# Patient Record
Sex: FEMALE | Race: WHITE | NOT HISPANIC OR LATINO | URBAN - METROPOLITAN AREA
[De-identification: names, ages, dates, MRNs, and addresses within clinical notes are randomized per-mention and may not be internally consistent; named-entity substitution may affect disease eponyms.]

---

## 2018-06-13 ENCOUNTER — EMERGENCY (EMERGENCY)
Facility: HOSPITAL | Age: 83
LOS: 1 days | Discharge: HOME | End: 2018-06-13
Attending: EMERGENCY MEDICINE
Payer: MEDICARE

## 2018-06-13 VITALS
HEART RATE: 94 BPM | OXYGEN SATURATION: 98 % | RESPIRATION RATE: 18 BRPM | DIASTOLIC BLOOD PRESSURE: 74 MMHG | SYSTOLIC BLOOD PRESSURE: 165 MMHG | TEMPERATURE: 99 F

## 2018-06-13 VITALS
DIASTOLIC BLOOD PRESSURE: 77 MMHG | TEMPERATURE: 98 F | SYSTOLIC BLOOD PRESSURE: 158 MMHG | OXYGEN SATURATION: 99 % | HEART RATE: 99 BPM | RESPIRATION RATE: 18 BRPM

## 2018-06-13 LAB
ALBUMIN SERPL ELPH-MCNC: 4.5 G/DL — SIGNIFICANT CHANGE UP (ref 3.5–5)
ALP SERPL-CCNC: 79 U/L — SIGNIFICANT CHANGE UP (ref 40–120)
ALT FLD-CCNC: 19 U/L DA — SIGNIFICANT CHANGE UP (ref 10–60)
ANION GAP SERPL CALC-SCNC: 6 MMOL/L — SIGNIFICANT CHANGE UP (ref 5–17)
APPEARANCE UR: CLEAR — SIGNIFICANT CHANGE UP
AST SERPL-CCNC: 12 U/L — SIGNIFICANT CHANGE UP (ref 10–40)
BASOPHILS # BLD AUTO: 0 K/UL — SIGNIFICANT CHANGE UP (ref 0–0.2)
BASOPHILS NFR BLD AUTO: 0.7 % — SIGNIFICANT CHANGE UP (ref 0–2)
BILIRUB SERPL-MCNC: 0.3 MG/DL — SIGNIFICANT CHANGE UP (ref 0.2–1.2)
BILIRUB UR-MCNC: NEGATIVE — SIGNIFICANT CHANGE UP
BUN SERPL-MCNC: 9 MG/DL — SIGNIFICANT CHANGE UP (ref 7–18)
CALCIUM SERPL-MCNC: 9.5 MG/DL — SIGNIFICANT CHANGE UP (ref 8.4–10.5)
CHLORIDE SERPL-SCNC: 100 MMOL/L — SIGNIFICANT CHANGE UP (ref 96–108)
CO2 SERPL-SCNC: 29 MMOL/L — SIGNIFICANT CHANGE UP (ref 22–31)
COLOR SPEC: YELLOW — SIGNIFICANT CHANGE UP
CREAT SERPL-MCNC: 0.78 MG/DL — SIGNIFICANT CHANGE UP (ref 0.5–1.3)
DIFF PNL FLD: NEGATIVE — SIGNIFICANT CHANGE UP
EOSINOPHIL # BLD AUTO: 0 K/UL — SIGNIFICANT CHANGE UP (ref 0–0.5)
EOSINOPHIL NFR BLD AUTO: 0.3 % — SIGNIFICANT CHANGE UP (ref 0–6)
GLUCOSE SERPL-MCNC: 99 MG/DL — SIGNIFICANT CHANGE UP (ref 70–99)
GLUCOSE UR QL: NEGATIVE — SIGNIFICANT CHANGE UP
HCT VFR BLD CALC: 43.2 % — SIGNIFICANT CHANGE UP (ref 34.5–45)
HGB BLD-MCNC: 14.1 G/DL — SIGNIFICANT CHANGE UP (ref 11.5–15.5)
KETONES UR-MCNC: ABNORMAL
LACTATE SERPL-SCNC: 1.8 MMOL/L — SIGNIFICANT CHANGE UP (ref 0.7–2)
LEUKOCYTE ESTERASE UR-ACNC: ABNORMAL
LIDOCAIN IGE QN: 213 U/L — SIGNIFICANT CHANGE UP (ref 73–393)
LYMPHOCYTES # BLD AUTO: 1 K/UL — SIGNIFICANT CHANGE UP (ref 1–3.3)
LYMPHOCYTES # BLD AUTO: 17 % — SIGNIFICANT CHANGE UP (ref 13–44)
MCHC RBC-ENTMCNC: 30.7 PG — SIGNIFICANT CHANGE UP (ref 27–34)
MCHC RBC-ENTMCNC: 32.5 GM/DL — SIGNIFICANT CHANGE UP (ref 32–36)
MCV RBC AUTO: 94.4 FL — SIGNIFICANT CHANGE UP (ref 80–100)
MONOCYTES # BLD AUTO: 0.4 K/UL — SIGNIFICANT CHANGE UP (ref 0–0.9)
MONOCYTES NFR BLD AUTO: 6.9 % — SIGNIFICANT CHANGE UP (ref 2–14)
NEUTROPHILS # BLD AUTO: 4.2 K/UL — SIGNIFICANT CHANGE UP (ref 1.8–7.4)
NEUTROPHILS NFR BLD AUTO: 75 % — SIGNIFICANT CHANGE UP (ref 43–77)
NITRITE UR-MCNC: NEGATIVE — SIGNIFICANT CHANGE UP
PH UR: 8 — SIGNIFICANT CHANGE UP (ref 5–8)
PLATELET # BLD AUTO: 238 K/UL — SIGNIFICANT CHANGE UP (ref 150–400)
POTASSIUM SERPL-MCNC: 4.8 MMOL/L — SIGNIFICANT CHANGE UP (ref 3.5–5.3)
POTASSIUM SERPL-SCNC: 4.8 MMOL/L — SIGNIFICANT CHANGE UP (ref 3.5–5.3)
PROT SERPL-MCNC: 7.6 G/DL — SIGNIFICANT CHANGE UP (ref 6–8.3)
PROT UR-MCNC: NEGATIVE — SIGNIFICANT CHANGE UP
RBC # BLD: 4.58 M/UL — SIGNIFICANT CHANGE UP (ref 3.8–5.2)
RBC # FLD: 11.7 % — SIGNIFICANT CHANGE UP (ref 10.3–14.5)
SODIUM SERPL-SCNC: 135 MMOL/L — SIGNIFICANT CHANGE UP (ref 135–145)
SP GR SPEC: 1.01 — SIGNIFICANT CHANGE UP (ref 1.01–1.02)
TROPONIN I SERPL-MCNC: <0.015 NG/ML — SIGNIFICANT CHANGE UP (ref 0–0.04)
UROBILINOGEN FLD QL: NEGATIVE — SIGNIFICANT CHANGE UP
WBC # BLD: 5.6 K/UL — SIGNIFICANT CHANGE UP (ref 3.8–10.5)
WBC # FLD AUTO: 5.6 K/UL — SIGNIFICANT CHANGE UP (ref 3.8–10.5)

## 2018-06-13 PROCEDURE — 99285 EMERGENCY DEPT VISIT HI MDM: CPT

## 2018-06-13 PROCEDURE — 96374 THER/PROPH/DIAG INJ IV PUSH: CPT | Mod: XU

## 2018-06-13 PROCEDURE — 74177 CT ABD & PELVIS W/CONTRAST: CPT

## 2018-06-13 PROCEDURE — 80053 COMPREHEN METABOLIC PANEL: CPT

## 2018-06-13 PROCEDURE — 87086 URINE CULTURE/COLONY COUNT: CPT

## 2018-06-13 PROCEDURE — 85027 COMPLETE CBC AUTOMATED: CPT

## 2018-06-13 PROCEDURE — 83605 ASSAY OF LACTIC ACID: CPT

## 2018-06-13 PROCEDURE — 83690 ASSAY OF LIPASE: CPT

## 2018-06-13 PROCEDURE — 76705 ECHO EXAM OF ABDOMEN: CPT

## 2018-06-13 PROCEDURE — 99284 EMERGENCY DEPT VISIT MOD MDM: CPT | Mod: 25

## 2018-06-13 PROCEDURE — 76705 ECHO EXAM OF ABDOMEN: CPT | Mod: 26

## 2018-06-13 PROCEDURE — 81001 URINALYSIS AUTO W/SCOPE: CPT

## 2018-06-13 PROCEDURE — 74177 CT ABD & PELVIS W/CONTRAST: CPT | Mod: 26

## 2018-06-13 PROCEDURE — 84484 ASSAY OF TROPONIN QUANT: CPT

## 2018-06-13 RX ORDER — FAMOTIDINE 10 MG/ML
20 INJECTION INTRAVENOUS ONCE
Qty: 0 | Refills: 0 | Status: COMPLETED | OUTPATIENT
Start: 2018-06-13 | End: 2018-06-13

## 2018-06-13 RX ORDER — SODIUM CHLORIDE 9 MG/ML
1000 INJECTION INTRAMUSCULAR; INTRAVENOUS; SUBCUTANEOUS ONCE
Qty: 0 | Refills: 0 | Status: COMPLETED | OUTPATIENT
Start: 2018-06-13 | End: 2018-06-13

## 2018-06-13 RX ORDER — SODIUM CHLORIDE 9 MG/ML
3 INJECTION INTRAMUSCULAR; INTRAVENOUS; SUBCUTANEOUS ONCE
Qty: 0 | Refills: 0 | Status: COMPLETED | OUTPATIENT
Start: 2018-06-13 | End: 2018-06-13

## 2018-06-13 RX ORDER — NITROFURANTOIN MACROCRYSTAL 50 MG
1 CAPSULE ORAL
Qty: 14 | Refills: 0
Start: 2018-06-13 | End: 2018-06-19

## 2018-06-13 RX ORDER — MORPHINE SULFATE 50 MG/1
2 CAPSULE, EXTENDED RELEASE ORAL ONCE
Qty: 0 | Refills: 0 | Status: DISCONTINUED | OUTPATIENT
Start: 2018-06-13 | End: 2018-06-13

## 2018-06-13 RX ADMIN — SODIUM CHLORIDE 3 MILLILITER(S): 9 INJECTION INTRAMUSCULAR; INTRAVENOUS; SUBCUTANEOUS at 13:34

## 2018-06-13 RX ADMIN — FAMOTIDINE 20 MILLIGRAM(S): 10 INJECTION INTRAVENOUS at 16:48

## 2018-06-13 RX ADMIN — SODIUM CHLORIDE 1000 MILLILITER(S): 9 INJECTION INTRAMUSCULAR; INTRAVENOUS; SUBCUTANEOUS at 16:48

## 2018-06-13 NOTE — ED PROVIDER NOTE - OBJECTIVE STATEMENT
82 y/o female with PMHx of HTN, HLD presents to the ED c/o RUQ abd pain x 4 days. Pt notes she is currently on Pantoprazole and ASS, but has not been able to take her meds today. Pt denies vomiting, fever, or any other complaints. NKDA.

## 2018-06-13 NOTE — ED PROVIDER NOTE - PROGRESS NOTE DETAILS
Lugo:  Pt received in signout to f/u UA and anticipated D/C.  UA with possible UTI.  D/w Pt with Thai interpretor, Glen # [id number].  Will prescribe Macrobid.  Advised GI and PMD f/u and return precautions.  Pt feels improved overall.

## 2018-06-13 NOTE — ED ADULT NURSE NOTE - OBJECTIVE STATEMENT
pt is here for abdominal pain for 3 days. c/o pain 7/10, denied N/V/D, denied fever or sob, pt calm at this time.

## 2018-06-13 NOTE — ED PROVIDER NOTE - CARE PLAN
Principal Discharge DX:	Syncope, unspecified syncope type  Secondary Diagnosis:	Alcohol withdrawal Principal Discharge DX:	Right upper quadrant abdominal pain

## 2018-06-14 LAB
CULTURE RESULTS: NO GROWTH — SIGNIFICANT CHANGE UP
SPECIMEN SOURCE: SIGNIFICANT CHANGE UP

## 2019-04-06 ENCOUNTER — EMERGENCY (EMERGENCY)
Facility: HOSPITAL | Age: 84
LOS: 1 days | Discharge: HOME | End: 2019-04-06
Attending: EMERGENCY MEDICINE
Payer: MEDICARE

## 2019-04-06 VITALS
HEIGHT: 65 IN | DIASTOLIC BLOOD PRESSURE: 76 MMHG | TEMPERATURE: 98 F | HEART RATE: 88 BPM | RESPIRATION RATE: 16 BRPM | WEIGHT: 138.01 LBS | SYSTOLIC BLOOD PRESSURE: 130 MMHG | OXYGEN SATURATION: 97 %

## 2019-04-06 PROCEDURE — 93005 ELECTROCARDIOGRAM TRACING: CPT

## 2019-04-06 PROCEDURE — 99282 EMERGENCY DEPT VISIT SF MDM: CPT

## 2019-04-06 PROCEDURE — 93010 ELECTROCARDIOGRAM REPORT: CPT

## 2019-04-06 PROCEDURE — 99283 EMERGENCY DEPT VISIT LOW MDM: CPT | Mod: 25

## 2019-04-06 NOTE — ED PROVIDER NOTE - NSFOLLOWUPCLINICS_GEN_ALL_ED_FT
[facility]  [facility]  [address] [facility] [place] - [address] [place] [address]  Phone: [phone]  Fax: [phone]  Follow Up Time:

## 2019-04-06 NOTE — ED PROVIDER NOTE - CLINICAL SUMMARY MEDICAL DECISION MAKING FREE TEXT BOX
83 y/o F presents with likely MSK pain and elevated blood pressure secondary to pain. Low suspicion for hypertensive emergency or ACS. Discharge with PMD follow up.

## 2019-04-06 NOTE — ED PROVIDER NOTE - OBJECTIVE STATEMENT
85 y/o F with PMHx of HTN, HLD and no significant PSHx presents to the ED with complaints of elevated blood pressure and L elbow pain. Patient states pain began today; states pain possibly explained by the fact, patient was reading and rested elbow on hard surface. Denies chest pain, shortness of breath, palpitations, dizziness, headache, neurological symptoms or any other acute complaints.

## 2019-04-06 NOTE — ED PROVIDER NOTE - NSFOLLOWUPINSTRUCTIONS_ED_ALL_ED_FT
Take meds as prescribed by your doctor.  Take Tylenol/Ibuprofen for pains/fevers as needed.  Drink plenty of fluids.  Follow up with your doctor or in the Clinic as discussed within 2 days.  Return to the ER for any concerns.

## 2019-08-15 ENCOUNTER — INPATIENT (INPATIENT)
Facility: HOSPITAL | Age: 84
LOS: 0 days | Discharge: HOME | DRG: 310 | End: 2019-08-16
Attending: INTERNAL MEDICINE | Admitting: INTERNAL MEDICINE
Payer: MEDICARE

## 2019-08-15 VITALS
TEMPERATURE: 98 F | RESPIRATION RATE: 16 BRPM | WEIGHT: 136.03 LBS | HEIGHT: 65 IN | SYSTOLIC BLOOD PRESSURE: 133 MMHG | HEART RATE: 87 BPM | DIASTOLIC BLOOD PRESSURE: 84 MMHG | OXYGEN SATURATION: 97 %

## 2019-08-15 DIAGNOSIS — R07.9 CHEST PAIN, UNSPECIFIED: ICD-10-CM

## 2019-08-15 DIAGNOSIS — I10 ESSENTIAL (PRIMARY) HYPERTENSION: ICD-10-CM

## 2019-08-15 DIAGNOSIS — E78.5 HYPERLIPIDEMIA, UNSPECIFIED: ICD-10-CM

## 2019-08-15 DIAGNOSIS — Z29.9 ENCOUNTER FOR PROPHYLACTIC MEASURES, UNSPECIFIED: ICD-10-CM

## 2019-08-15 LAB
ALBUMIN SERPL ELPH-MCNC: 3.5 G/DL — SIGNIFICANT CHANGE UP (ref 3.5–5)
ALP SERPL-CCNC: 70 U/L — SIGNIFICANT CHANGE UP (ref 40–120)
ALT FLD-CCNC: 17 U/L DA — SIGNIFICANT CHANGE UP (ref 10–60)
ANION GAP SERPL CALC-SCNC: 5 MMOL/L — SIGNIFICANT CHANGE UP (ref 5–17)
APTT BLD: 27.4 SEC — LOW (ref 27.5–36.3)
AST SERPL-CCNC: 14 U/L — SIGNIFICANT CHANGE UP (ref 10–40)
BASOPHILS # BLD AUTO: 0.02 K/UL — SIGNIFICANT CHANGE UP (ref 0–0.2)
BASOPHILS NFR BLD AUTO: 0.5 % — SIGNIFICANT CHANGE UP (ref 0–2)
BILIRUB SERPL-MCNC: 0.3 MG/DL — SIGNIFICANT CHANGE UP (ref 0.2–1.2)
BUN SERPL-MCNC: 11 MG/DL — SIGNIFICANT CHANGE UP (ref 7–18)
CALCIUM SERPL-MCNC: 9 MG/DL — SIGNIFICANT CHANGE UP (ref 8.4–10.5)
CHLORIDE SERPL-SCNC: 102 MMOL/L — SIGNIFICANT CHANGE UP (ref 96–108)
CK MB BLD-MCNC: 2.6 % — SIGNIFICANT CHANGE UP (ref 0–3.5)
CK MB CFR SERPL CALC: 2.9 NG/ML — SIGNIFICANT CHANGE UP (ref 0–3.6)
CK SERPL-CCNC: 110 U/L — SIGNIFICANT CHANGE UP (ref 21–215)
CO2 SERPL-SCNC: 29 MMOL/L — SIGNIFICANT CHANGE UP (ref 22–31)
CREAT SERPL-MCNC: 0.85 MG/DL — SIGNIFICANT CHANGE UP (ref 0.5–1.3)
EOSINOPHIL # BLD AUTO: 0.07 K/UL — SIGNIFICANT CHANGE UP (ref 0–0.5)
EOSINOPHIL NFR BLD AUTO: 1.8 % — SIGNIFICANT CHANGE UP (ref 0–6)
GLUCOSE SERPL-MCNC: 96 MG/DL — SIGNIFICANT CHANGE UP (ref 70–99)
HCT VFR BLD CALC: 37.7 % — SIGNIFICANT CHANGE UP (ref 34.5–45)
HGB BLD-MCNC: 12.8 G/DL — SIGNIFICANT CHANGE UP (ref 11.5–15.5)
IMM GRANULOCYTES NFR BLD AUTO: 0.3 % — SIGNIFICANT CHANGE UP (ref 0–1.5)
INR BLD: 0.89 RATIO — SIGNIFICANT CHANGE UP (ref 0.88–1.16)
LYMPHOCYTES # BLD AUTO: 0.84 K/UL — LOW (ref 1–3.3)
LYMPHOCYTES # BLD AUTO: 21.8 % — SIGNIFICANT CHANGE UP (ref 13–44)
MAGNESIUM SERPL-MCNC: 2.3 MG/DL — SIGNIFICANT CHANGE UP (ref 1.6–2.6)
MCHC RBC-ENTMCNC: 31.8 PG — SIGNIFICANT CHANGE UP (ref 27–34)
MCHC RBC-ENTMCNC: 34 GM/DL — SIGNIFICANT CHANGE UP (ref 32–36)
MCV RBC AUTO: 93.8 FL — SIGNIFICANT CHANGE UP (ref 80–100)
MONOCYTES # BLD AUTO: 0.43 K/UL — SIGNIFICANT CHANGE UP (ref 0–0.9)
MONOCYTES NFR BLD AUTO: 11.1 % — SIGNIFICANT CHANGE UP (ref 2–14)
NEUTROPHILS # BLD AUTO: 2.49 K/UL — SIGNIFICANT CHANGE UP (ref 1.8–7.4)
NEUTROPHILS NFR BLD AUTO: 64.5 % — SIGNIFICANT CHANGE UP (ref 43–77)
NRBC # BLD: 0 /100 WBCS — SIGNIFICANT CHANGE UP (ref 0–0)
PHOSPHATE SERPL-MCNC: 2.7 MG/DL — SIGNIFICANT CHANGE UP (ref 2.5–4.5)
PLATELET # BLD AUTO: 217 K/UL — SIGNIFICANT CHANGE UP (ref 150–400)
POTASSIUM SERPL-MCNC: 4.2 MMOL/L — SIGNIFICANT CHANGE UP (ref 3.5–5.3)
POTASSIUM SERPL-SCNC: 4.2 MMOL/L — SIGNIFICANT CHANGE UP (ref 3.5–5.3)
PROT SERPL-MCNC: 6.4 G/DL — SIGNIFICANT CHANGE UP (ref 6–8.3)
PROTHROM AB SERPL-ACNC: 9.8 SEC — LOW (ref 10–12.9)
RBC # BLD: 4.02 M/UL — SIGNIFICANT CHANGE UP (ref 3.8–5.2)
RBC # FLD: 12.6 % — SIGNIFICANT CHANGE UP (ref 10.3–14.5)
SODIUM SERPL-SCNC: 136 MMOL/L — SIGNIFICANT CHANGE UP (ref 135–145)
TROPONIN I SERPL-MCNC: <0.015 NG/ML — SIGNIFICANT CHANGE UP (ref 0–0.04)
TROPONIN I SERPL-MCNC: <0.015 NG/ML — SIGNIFICANT CHANGE UP (ref 0–0.04)
WBC # BLD: 3.86 K/UL — SIGNIFICANT CHANGE UP (ref 3.8–10.5)
WBC # FLD AUTO: 3.86 K/UL — SIGNIFICANT CHANGE UP (ref 3.8–10.5)

## 2019-08-15 PROCEDURE — 99285 EMERGENCY DEPT VISIT HI MDM: CPT

## 2019-08-15 PROCEDURE — 71045 X-RAY EXAM CHEST 1 VIEW: CPT | Mod: 26

## 2019-08-15 RX ORDER — METOPROLOL TARTRATE 50 MG
50 TABLET ORAL DAILY
Refills: 0 | Status: DISCONTINUED | OUTPATIENT
Start: 2019-08-15 | End: 2019-08-15

## 2019-08-15 RX ORDER — ATORVASTATIN CALCIUM 80 MG/1
80 TABLET, FILM COATED ORAL AT BEDTIME
Refills: 0 | Status: DISCONTINUED | OUTPATIENT
Start: 2019-08-15 | End: 2019-08-16

## 2019-08-15 RX ORDER — ATORVASTATIN CALCIUM 80 MG/1
20 TABLET, FILM COATED ORAL AT BEDTIME
Refills: 0 | Status: DISCONTINUED | OUTPATIENT
Start: 2019-08-15 | End: 2019-08-15

## 2019-08-15 RX ORDER — NITROGLYCERIN 6.5 MG
0 CAPSULE, EXTENDED RELEASE ORAL
Qty: 0 | Refills: 0 | DISCHARGE

## 2019-08-15 RX ORDER — OMEPRAZOLE 10 MG/1
0 CAPSULE, DELAYED RELEASE ORAL
Qty: 0 | Refills: 0 | DISCHARGE

## 2019-08-15 RX ORDER — ASPIRIN/CALCIUM CARB/MAGNESIUM 324 MG
81 TABLET ORAL DAILY
Refills: 0 | Status: DISCONTINUED | OUTPATIENT
Start: 2019-08-15 | End: 2019-08-16

## 2019-08-15 RX ORDER — ENOXAPARIN SODIUM 100 MG/ML
40 INJECTION SUBCUTANEOUS DAILY
Refills: 0 | Status: DISCONTINUED | OUTPATIENT
Start: 2019-08-15 | End: 2019-08-16

## 2019-08-15 RX ORDER — POLYETHYLENE GLYCOL 3350 17 G/17G
17 POWDER, FOR SOLUTION ORAL DAILY
Refills: 0 | Status: DISCONTINUED | OUTPATIENT
Start: 2019-08-15 | End: 2019-08-16

## 2019-08-15 RX ADMIN — ATORVASTATIN CALCIUM 80 MILLIGRAM(S): 80 TABLET, FILM COATED ORAL at 21:18

## 2019-08-15 RX ADMIN — Medication 81 MILLIGRAM(S): at 12:06

## 2019-08-15 RX ADMIN — Medication 10 MILLIGRAM(S): at 21:18

## 2019-08-15 RX ADMIN — POLYETHYLENE GLYCOL 3350 17 GRAM(S): 17 POWDER, FOR SOLUTION ORAL at 16:35

## 2019-08-15 RX ADMIN — Medication 50 MILLIGRAM(S): at 12:09

## 2019-08-15 RX ADMIN — ENOXAPARIN SODIUM 40 MILLIGRAM(S): 100 INJECTION SUBCUTANEOUS at 12:06

## 2019-08-15 NOTE — H&P ADULT - HISTORY OF PRESENT ILLNESS
85 yo F from home, lives alone, ambulates independently with pmh of HTN, HLD, Disc herniation, chronic cystitis presents from home after cardiology office called her about a possible arrythmia on her Holter monitor. Pt was placed on Holter monitor for 24 hrs initially and then extended to 3 weeks by her cardiologist She received a call at 3 am today that it is medical emergency and she has to go to the hospital immediately due to arrhythmia. She experienced contraction and relaxation of the heart. It is a/w chest pain, pressure type, squeezing, sub sternal, radiating laterally and to the back, aggravated with anxiety, negative thoughts and relieved with distractions. Her son passed away couple of years ago due to stroke that makes her condition worse. She was hypotensive 90/45, her PMD decreased the dose of Enalapril to 2.5 from 10mg. She had prior episodes of LOC twice in a year but since 2 yrs not experiencing. When she has LOC, she sits on the floor, EMS will be called and she will be taken to the Clinic where they administer some different solutions and monitor her for 2-3 hrs.. Denies fever, cough, SOB, palpitations, syncope, dizziness, edema, nausea, vomiting, pleuritic chest pain, palpitations.  GOC Needs time to make a decision.

## 2019-08-15 NOTE — ED PROVIDER NOTE - NS ED ROS FT
CONSTITUTIONAL: no fever, no chills   EYES: no visual changes, no eye pain   ENMT: no nasal congestion, no throat pain  CARDIOVASCULAR: +chest pain, no edema, no palpitations   RESPIRATORY: no shortness of breath, no cough   GASTROINTESTINAL: no abdominal pain, no nausea, no vomiting, no diarrhea, no constipation   GENITOURINARY: no dysuria, no frequency  MUSCULOSKELETAL: no joint pains, no myalgias, no back pain   SKIN: no rashes  NEUROLOGICAL: no weakness, no headache, no dizziness, no slurred speech, no syncope   PSYCHIATRIC: no known mental health illness   HEME/LYMPH: no lymphadenopathy      All other ROS negative except as per HPI

## 2019-08-15 NOTE — ED PROVIDER NOTE - OBJECTIVE STATEMENT
[id number] 83 yo F pmh of HTN and HLD presents from home after cardiology office called her about a possible arrythmia on her Holter monitor. Pt also c/o CP that started yesterday, worse on L side, lasted for a few hours, non pleuritic, not related to exertion. Denies fever, cough, SOB, palpitations, syncope, dizziness, edema, other acute complaints. Dominican  [id number]

## 2019-08-15 NOTE — H&P ADULT - ASSESSMENT
83 yo F from home, lives alone, ambulates independently with pmh of HTN, HLD, Disc herniation, chronic cystitis presents from home after cardiology office called her about a possible arrythmia on her Holter monitor.. She was admitted to [facility]

## 2019-08-15 NOTE — ED PROVIDER NOTE - PROGRESS NOTE DETAILS
EKG - nsr, rate 67, 1st deg AV block, PACs  labs - no acute findings  CXR - rotated, no infiltrates   Spoke with pt's primary cardiologist Dr Kamran Fay who requests consult with Dr Redmond or Dr Hinds. He was told by team monitoring pt's Holter that she was having multiple episodes of sinus pause. EKG - nsr, rate 67, 1st deg AV block, PACs  labs - no acute findings  CXR - rotated, no infiltrates   Spoke with pt's primary cardiologist Dr Kamran Fay; he was told by team monitoring pt's Holter that she was having multiple episodes of sinus pause; requests consult with Dr Redmond who is being covered by Dr Chand who requests admission to Dr Marie. Endorsed to MAR.

## 2019-08-15 NOTE — ED ADULT NURSE NOTE - OBJECTIVE STATEMENT
Pateint is a 84 y.o femal complaingin of chhest  x 1 day, Patient is a 84 y.o femal complaining of chest pain  x 1 day, Patient is a 84 y.o female complaining of chest pain  x 2 days.

## 2019-08-15 NOTE — H&P ADULT - NSHPPHYSICALEXAM_GEN_ALL_CORE
CONSTITUTIONAL: Well appearing, well nourished, awake, alert and in no apparent distress  ENT: Airway patent, Nasal mucosa clear. Mouth with normal mucosa. Throat has no vesicles, no oropharyngeal exudates and uvula is midline.  EYES: Clear bilaterally, pupils equal, round and reactive to light. EOMI.  CARDIAC: Normal rate, regular rhythm.  Heart sounds S1, S2.  No murmurs, rubs or gallops   RESPIRATORY: Breath sounds clear and equal bilaterally. No wheezes, rales or rhonchi  MUSCULOSKELETAL: Spine appears normal, range of motion is not limited, no muscle or joint tenderness  EXTREMITIES: No edema, cyanosis or deformity   NEUROLOGICAL: Alert and oriented, no focal deficits, no motor or sensory deficits.  SKIN: No rash, skin turgor

## 2019-08-15 NOTE — H&P ADULT - PROBLEM SELECTOR PLAN 4
IMPROVE VTE Individual Risk Assessment    RISK                                                                Points  [  ] Previous VTE                                                  3  [  ] Thrombophilia                                               2  [  ] Lower limb paralysis                                      2        (unable to hold up >15 seconds)    [  ] Current Cancer                                              2         (within 6 months)  [x ] Immobilization > 24 hrs                                1  [  ] ICU/CCU stay > 24 hours                              1  [x  ] Age > 60                                                      1  IMPROVE VTE Score _2__DVT ppx Lovenox 40 sub q______  )

## 2019-08-15 NOTE — H&P ADULT - PROBLEM SELECTOR PLAN 1
Pt presented with pressure like sub sternal chest pain concerning for ACS.  On Tele  HEART score 4, moderate risk   EKG showed premature atrial complexes  Troponin x 1 negative, f/u Trop 2,3  f/u ECHO  Cardiologist Dr Redmond  Continue Metoprolol, lipitor, aspirin Pt presented with pressure like sub sternal chest pain concerning for ACS.  On Tele  HEART score 4, moderate risk   EKG showed premature atrial complexes  Troponin x 1and T2 negative, f/u Trop ,3  f/u ECHO  Cardiologist Dr Redmond   Metoprolol is on hold for pauses on holter monitor  c/w  lipitor, aspirin

## 2019-08-15 NOTE — H&P ADULT - PROBLEM SELECTOR PLAN 2
Resumed home medications   -Currently on Metoprolol 50 and enalapril 2.5  -BP WNL upon evaluation  DASH diet   -Continue to monitor Resumed home medications   -Currently  on  enalapril 10 dose is increased from home dose 2.5  -BP WNL upon evaluation  DASH diet   -Continue to monitor

## 2019-08-15 NOTE — H&P ADULT - NSHPLABSRESULTS_GEN_ALL_CORE
LABS:                        12.8   3.86  )-----------( 217      ( 15 Aug 2019 04:58 )             37.7     08-15    136  |  102  |  11  ----------------------------<  96  4.2   |  29  |  0.85    Ca    9.0      15 Aug 2019 04:58    TPro  6.4  /  Alb  3.5  /  TBili  0.3  /  DBili  x   /  AST  14  /  ALT  17  /  AlkPhos  70  08-15    PT/INR - ( 15 Aug 2019 04:58 )   PT: 9.8 sec;   INR: 0.89 ratio         PTT - ( 15 Aug 2019 04:58 )  PTT:27.4 sec    LIVER FUNCTIONS - ( 15 Aug 2019 04:58 )  Alb: 3.5 g/dL / Pro: 6.4 g/dL / ALK PHOS: 70 U/L / ALT: 17 U/L DA / AST: 14 U/L / GGT: x

## 2019-08-15 NOTE — ED PROVIDER NOTE - CLINICAL SUMMARY MEDICAL DECISION MAKING FREE TEXT BOX
85 yo F with CP. Also told about a possible arrythmia on Holter monitor. Will place on monitor in ED, EKG, labs, CXR, reassess.

## 2019-08-16 ENCOUNTER — TRANSCRIPTION ENCOUNTER (OUTPATIENT)
Age: 84
End: 2019-08-16

## 2019-08-16 VITALS
TEMPERATURE: 99 F | DIASTOLIC BLOOD PRESSURE: 70 MMHG | HEART RATE: 80 BPM | OXYGEN SATURATION: 98 % | RESPIRATION RATE: 18 BRPM | SYSTOLIC BLOOD PRESSURE: 133 MMHG

## 2019-08-16 LAB
24R-OH-CALCIDIOL SERPL-MCNC: 37.5 NG/ML — SIGNIFICANT CHANGE UP (ref 30–80)
ALBUMIN SERPL ELPH-MCNC: 3.7 G/DL — SIGNIFICANT CHANGE UP (ref 3.5–5)
ALP SERPL-CCNC: 61 U/L — SIGNIFICANT CHANGE UP (ref 40–120)
ALT FLD-CCNC: 16 U/L DA — SIGNIFICANT CHANGE UP (ref 10–60)
ANION GAP SERPL CALC-SCNC: 7 MMOL/L — SIGNIFICANT CHANGE UP (ref 5–17)
AST SERPL-CCNC: 12 U/L — SIGNIFICANT CHANGE UP (ref 10–40)
BASOPHILS # BLD AUTO: 0.03 K/UL — SIGNIFICANT CHANGE UP (ref 0–0.2)
BASOPHILS NFR BLD AUTO: 0.7 % — SIGNIFICANT CHANGE UP (ref 0–2)
BILIRUB SERPL-MCNC: 0.5 MG/DL — SIGNIFICANT CHANGE UP (ref 0.2–1.2)
BUN SERPL-MCNC: 10 MG/DL — SIGNIFICANT CHANGE UP (ref 7–18)
CALCIUM SERPL-MCNC: 9.2 MG/DL — SIGNIFICANT CHANGE UP (ref 8.4–10.5)
CHLORIDE SERPL-SCNC: 100 MMOL/L — SIGNIFICANT CHANGE UP (ref 96–108)
CHOLEST SERPL-MCNC: 222 MG/DL — HIGH (ref 10–199)
CK MB BLD-MCNC: 2.3 % — SIGNIFICANT CHANGE UP (ref 0–3.5)
CK MB CFR SERPL CALC: 2.3 NG/ML — SIGNIFICANT CHANGE UP (ref 0–3.6)
CK SERPL-CCNC: 99 U/L — SIGNIFICANT CHANGE UP (ref 21–215)
CO2 SERPL-SCNC: 26 MMOL/L — SIGNIFICANT CHANGE UP (ref 22–31)
CREAT SERPL-MCNC: 0.72 MG/DL — SIGNIFICANT CHANGE UP (ref 0.5–1.3)
EOSINOPHIL # BLD AUTO: 0.04 K/UL — SIGNIFICANT CHANGE UP (ref 0–0.5)
EOSINOPHIL NFR BLD AUTO: 0.9 % — SIGNIFICANT CHANGE UP (ref 0–6)
FOLATE SERPL-MCNC: 14.2 NG/ML — SIGNIFICANT CHANGE UP
GLUCOSE SERPL-MCNC: 96 MG/DL — SIGNIFICANT CHANGE UP (ref 70–99)
HBA1C BLD-MCNC: 5.8 % — HIGH (ref 4–5.6)
HCT VFR BLD CALC: 39.1 % — SIGNIFICANT CHANGE UP (ref 34.5–45)
HDLC SERPL-MCNC: 102 MG/DL — SIGNIFICANT CHANGE UP
HGB BLD-MCNC: 13.3 G/DL — SIGNIFICANT CHANGE UP (ref 11.5–15.5)
IMM GRANULOCYTES NFR BLD AUTO: 0.2 % — SIGNIFICANT CHANGE UP (ref 0–1.5)
LIPID PNL WITH DIRECT LDL SERPL: 103 MG/DL — SIGNIFICANT CHANGE UP
LYMPHOCYTES # BLD AUTO: 1.02 K/UL — SIGNIFICANT CHANGE UP (ref 1–3.3)
LYMPHOCYTES # BLD AUTO: 24 % — SIGNIFICANT CHANGE UP (ref 13–44)
MAGNESIUM SERPL-MCNC: 2.3 MG/DL — SIGNIFICANT CHANGE UP (ref 1.6–2.6)
MCHC RBC-ENTMCNC: 31.3 PG — SIGNIFICANT CHANGE UP (ref 27–34)
MCHC RBC-ENTMCNC: 34 GM/DL — SIGNIFICANT CHANGE UP (ref 32–36)
MCV RBC AUTO: 92 FL — SIGNIFICANT CHANGE UP (ref 80–100)
MONOCYTES # BLD AUTO: 0.45 K/UL — SIGNIFICANT CHANGE UP (ref 0–0.9)
MONOCYTES NFR BLD AUTO: 10.6 % — SIGNIFICANT CHANGE UP (ref 2–14)
NEUTROPHILS # BLD AUTO: 2.7 K/UL — SIGNIFICANT CHANGE UP (ref 1.8–7.4)
NEUTROPHILS NFR BLD AUTO: 63.6 % — SIGNIFICANT CHANGE UP (ref 43–77)
NRBC # BLD: 0 /100 WBCS — SIGNIFICANT CHANGE UP (ref 0–0)
PHOSPHATE SERPL-MCNC: 3.1 MG/DL — SIGNIFICANT CHANGE UP (ref 2.5–4.5)
PLATELET # BLD AUTO: 244 K/UL — SIGNIFICANT CHANGE UP (ref 150–400)
POTASSIUM SERPL-MCNC: 3.9 MMOL/L — SIGNIFICANT CHANGE UP (ref 3.5–5.3)
POTASSIUM SERPL-SCNC: 3.9 MMOL/L — SIGNIFICANT CHANGE UP (ref 3.5–5.3)
PROT SERPL-MCNC: 6.6 G/DL — SIGNIFICANT CHANGE UP (ref 6–8.3)
RBC # BLD: 4.25 M/UL — SIGNIFICANT CHANGE UP (ref 3.8–5.2)
RBC # FLD: 12.8 % — SIGNIFICANT CHANGE UP (ref 10.3–14.5)
SODIUM SERPL-SCNC: 133 MMOL/L — LOW (ref 135–145)
TOTAL CHOLESTEROL/HDL RATIO MEASUREMENT: 2.2 RATIO — LOW (ref 3.3–7.1)
TRIGL SERPL-MCNC: 84 MG/DL — SIGNIFICANT CHANGE UP (ref 10–149)
TROPONIN I SERPL-MCNC: <0.015 NG/ML — SIGNIFICANT CHANGE UP (ref 0–0.04)
TSH SERPL-MCNC: 1.19 UU/ML — SIGNIFICANT CHANGE UP (ref 0.34–4.82)
VIT B12 SERPL-MCNC: 428 PG/ML — SIGNIFICANT CHANGE UP (ref 232–1245)
WBC # BLD: 4.25 K/UL — SIGNIFICANT CHANGE UP (ref 3.8–10.5)
WBC # FLD AUTO: 4.25 K/UL — SIGNIFICANT CHANGE UP (ref 3.8–10.5)

## 2019-08-16 PROCEDURE — 82553 CREATINE MB FRACTION: CPT

## 2019-08-16 PROCEDURE — 85610 PROTHROMBIN TIME: CPT

## 2019-08-16 PROCEDURE — 93306 TTE W/DOPPLER COMPLETE: CPT

## 2019-08-16 PROCEDURE — 80061 LIPID PANEL: CPT

## 2019-08-16 PROCEDURE — 84443 ASSAY THYROID STIM HORMONE: CPT

## 2019-08-16 PROCEDURE — 86900 BLOOD TYPING SEROLOGIC ABO: CPT

## 2019-08-16 PROCEDURE — 84484 ASSAY OF TROPONIN QUANT: CPT

## 2019-08-16 PROCEDURE — 85730 THROMBOPLASTIN TIME PARTIAL: CPT

## 2019-08-16 PROCEDURE — 71045 X-RAY EXAM CHEST 1 VIEW: CPT

## 2019-08-16 PROCEDURE — 83036 HEMOGLOBIN GLYCOSYLATED A1C: CPT

## 2019-08-16 PROCEDURE — 86901 BLOOD TYPING SEROLOGIC RH(D): CPT

## 2019-08-16 PROCEDURE — 36415 COLL VENOUS BLD VENIPUNCTURE: CPT

## 2019-08-16 PROCEDURE — 82306 VITAMIN D 25 HYDROXY: CPT

## 2019-08-16 PROCEDURE — 82607 VITAMIN B-12: CPT

## 2019-08-16 PROCEDURE — 99285 EMERGENCY DEPT VISIT HI MDM: CPT | Mod: 25

## 2019-08-16 PROCEDURE — 82746 ASSAY OF FOLIC ACID SERUM: CPT

## 2019-08-16 PROCEDURE — 83735 ASSAY OF MAGNESIUM: CPT

## 2019-08-16 PROCEDURE — 93005 ELECTROCARDIOGRAM TRACING: CPT

## 2019-08-16 PROCEDURE — 84100 ASSAY OF PHOSPHORUS: CPT

## 2019-08-16 PROCEDURE — 82550 ASSAY OF CK (CPK): CPT

## 2019-08-16 PROCEDURE — 85027 COMPLETE CBC AUTOMATED: CPT

## 2019-08-16 PROCEDURE — 86850 RBC ANTIBODY SCREEN: CPT

## 2019-08-16 PROCEDURE — 80053 COMPREHEN METABOLIC PANEL: CPT

## 2019-08-16 RX ADMIN — Medication 81 MILLIGRAM(S): at 12:29

## 2019-08-16 RX ADMIN — ENOXAPARIN SODIUM 40 MILLIGRAM(S): 100 INJECTION SUBCUTANEOUS at 12:30

## 2019-08-16 NOTE — DISCHARGE NOTE PROVIDER - NSDCCPCAREPLAN_GEN_ALL_CORE_FT
PRINCIPAL DISCHARGE DIAGNOSIS  Diagnosis: Chest pain  Assessment and Plan of Treatment: You camae to us with chest pain. We got your EKG, Cardiac enzymes which showed no ischemia. We ordered ECHO of your heart but you wanted to leave without following that up.   Please see your PCP and Cardiologist with in 1-2 weeks of geting discharge.      SECONDARY DISCHARGE DIAGNOSES  Diagnosis: Hypertension  Assessment and Plan of Treatment: Blood Pressure Control , Please continue current medication regimen, and follow up with your PCP. You have a history of Hypertension.  Your Blood Pressure was adequately controlled. You should continue on the current antihypertensive regimen regularly. You blood pressure should be within 140/90. You should follow-up with your PCP within 1 week of your discharge for routine blood pressure monitoring at your next visit Notify your doctor if you have any of the following symptoms:   (Dizziness, Lightheadedness, Blurry vision, Headache, Chest pain, Shortness of breath.) You should maintain healthy lifestyle by eating healthy low salt diet, avoid fatty food, weight loss, exercise regularly as tolerated 30 mins X 3 time per week. PRINCIPAL DISCHARGE DIAGNOSIS  Diagnosis: Chest pain  Assessment and Plan of Treatment: You camae to us with chest pain. We got your EKG, Cardiac enzymes which showed no ischemia. We ordered ECHO of your heart which shows EF >55% AND you need paceMAKER BASED ON STRIPS from your holter monitor. but you wanted to leave against medical advise and knows all the associated risks even death. We used [place] interpretors and spent 3 hours trying to convince you. We are giving you all the labs and test results as requested by you.  Please see your PCP and Cardiologist with in 1-2 weeks of geting discharge.      SECONDARY DISCHARGE DIAGNOSES  Diagnosis: Hypertension  Assessment and Plan of Treatment: Blood Pressure Control , Please continue current medication regimen, and follow up with your PCP. You have a history of Hypertension.  Your Blood Pressure was adequately controlled. You should continue on the current antihypertensive regimen regularly. You blood pressure should be within 140/90. You should follow-up with your PCP within 1 week of your discharge for routine blood pressure monitoring at your next visit Notify your doctor if you have any of the following symptoms:   (Dizziness, Lightheadedness, Blurry vision, Headache, Chest pain, Shortness of breath.) You should maintain healthy lifestyle by eating healthy low salt diet, avoid fatty food, weight loss, exercise regularly as tolerated 30 mins X 3 time per week.

## 2019-08-16 NOTE — DISCHARGE NOTE PROVIDER - CARE PROVIDER_API CALL
Amado Redmond)  Cardiovascular Disease  [address] [facility]  [place] [address]  Phone: [phone]  Fax: [phone]  Follow Up Time:

## 2019-08-16 NOTE — CONSULT NOTE ADULT - SUBJECTIVE AND OBJECTIVE BOX
HISTORY OF PRESENT ILLNESS: HPI:  83 yo F from home, lives alone, ambulates independently with pmh of HTN, HLD, Disc herniation, chronic cystitis presents from home after ?cardiology office called her about a possible arrythmia on her Holter monitor. Pt was placed on Holter monitor for 24 hrs initially and then extended to 3 weeks by her cardiologist She received a call at 3 am today that it is medical emergency and she has to go to the hospital immediately due to arrhythmia. Her son passed away couple of years ago due to stroke that makes her condition worse. She denies Anginal symptoms or LOC. She was hypotensive 90/45, her PMD decreased the dose of Enalapril to 2.5 from 10mg. She had prior episodes of LOC twice in a year but since 2 yrs not experiencing. When she has LOC, she sits on the floor, EMS will be called and she will be taken to the Clinic where they administer some different solutions and monitor her for 2-3 hrs.. Denies fever, cough, SOB, palpitations, syncope, dizziness, edema, nausea, vomiting, pleuritic chest pain, palpitations.  GOC Needs time to make a decision. (15 Aug 2019 09:46)      PAST MEDICAL & SURGICAL HISTORY:  HLD (hyperlipidemia)  Hypertension  No significant past surgical history          MEDICATIONS:  MEDICATIONS  (STANDING):  aspirin enteric coated 81 milliGRAM(s) Oral daily  atorvastatin 80 milliGRAM(s) Oral at bedtime  enalapril 10 milliGRAM(s) Oral daily  enoxaparin Injectable 40 milliGRAM(s) SubCutaneous daily  polyethylene glycol 3350 17 Gram(s) Oral daily      Allergies    No Known Allergies    Intolerances        FAMILY HISTORY:  Family history of cancer in father: liver  Family history of cancer in mother: Mother, she had a wart on head, bleeding, removed and biopsied. Diagnosed as malignant melanoma at the age of 23 years. Had 4 surgeries and passed away at the age of 79.  Family history of stroke or transient ischemic attack in son    Non-contributary for premature coronary disease or sudden cardiac death    SOCIAL HISTORY:    [ X] Non-smoker  [ ] Smoker  [ ] Alcohol      REVIEW OF SYSTEMS:  [ ]chest pain  [  ]shortness of breath  [  ]palpitations  [  ]syncope  [ ]near syncope [ ]upper extremity weakness   [ ] lower extremity weakness  [  ]diplopia  [  ]altered mental status   [  ]fevers  [ ]chills [ ]nausea  [ ]vomitting  [  ]dysphagia    [ ]abdominal pain  [ ]melena  [ ]BRBPR    [  ]epistaxis  [  ]rash    [ ]lower extremity edema        [X ] All others negative	  [ ] Unable to obtain      LABS:	 	    CARDIAC MARKERS:  CARDIAC MARKERS ( 16 Aug 2019 06:17 )  <0.015 ng/mL / x     / 99 U/L / x     / 2.3 ng/mL  CARDIAC MARKERS ( 15 Aug 2019 10:27 )  <0.015 ng/mL / x     / 110 U/L / x     / 2.9 ng/mL  CARDIAC MARKERS ( 15 Aug 2019 04:58 )  <0.015 ng/mL / x     / x     / x     / x                                  13.3   4.25  )-----------( 244      ( 16 Aug 2019 06:17 )             39.1     Hb Trend: 13.3<--    08-16    133<L>  |  100  |  10  ----------------------------<  96  3.9   |  26  |  0.72    Ca    9.2      16 Aug 2019 06:17  Phos  3.1     08-16  Mg     2.3     08-16    TPro  6.6  /  Alb  3.7  /  TBili  0.5  /  DBili  x   /  AST  12  /  ALT  16  /  AlkPhos  61  08-16    Creatinine Trend: 0.72<--, 0.85<--    HgA1c: Hemoglobin A1C, Whole Blood: 5.8 % (08-16 @ 09:10)    TSH: Thyroid Stimulating Hormone, Serum: 1.19 uU/mL (08-16 @ 06:17)          PHYSICAL EXAM:  T(C): 37 (08-16-19 @ 07:45), Max: 37 (08-16-19 @ 07:45)  HR: 82 (08-16-19 @ 07:45) (73 - 82)  BP: 132/90 (08-16-19 @ 07:45) (113/60 - 161/76)  RR: 18 (08-16-19 @ 07:45) (16 - 18)  SpO2: 98% (08-16-19 @ 07:45) (96% - 100%)  Wt(kg): --  I&O's Summary    15 Aug 2019 07:01  -  16 Aug 2019 07:00  --------------------------------------------------------  IN: 230 mL / OUT: 0 mL / NET: 230 mL    16 Aug 2019 07:01  -  16 Aug 2019 10:24  --------------------------------------------------------  IN: 200 mL / OUT: 0 mL / NET: 200 mL        Gen: Appears well in NAD  HEENT:  (-)icterus (-)pallor  CV: N S1 S2 1/6 JEFF (+)2 Pulses B/l  Resp:  Clear to ausculatation B/L, normal effort  GI: (+) BS Soft, NT, ND  Lymph:  (-)Edema, (-)obvious lymphadenopathy  Skin: Warm to touch, Normal turgor  Psych: Appropriate mood and affect        TELEMETRY: 	  Sinus, APC's some of which are blocked      ECG:  	    RADIOLOGY:         CXR:     ASSESSMENT/PLAN: 	84y Female HISTORY OF PRESENT ILLNESS: HPI:  85 yo F from home, lives alone, ambulates independently with pmh of HTN, HLD, Disc herniation, chronic cystitis presents from home after ?cardiology office called her about a possible arrythmia on her Holter monitor. Pt was placed on Holter monitor for 24 hrs initially and then extended to 3 weeks by her cardiologist She received a call at 3 am today that it is medical emergency and she has to go to the hospital immediately due to arrhythmia. Her son passed away couple of years ago due to stroke that makes her condition worse. She denies Anginal symptoms or LOC. She was hypotensive 90/45, her PMD decreased the dose of Enalapril to 2.5 from 10mg. She had prior episodes of LOC twice in a year but since 2 yrs not experiencing. When she has LOC, she sits on the floor, EMS will be called and she will be taken to the Clinic where they administer some different solutions and monitor her for 2-3 hrs.. Denies fever, cough, SOB, palpitations, syncope, dizziness, edema, nausea, vomiting, pleuritic chest pain, palpitations.  GOC Needs time to make a decision. (15 Aug 2019 09:46)      PAST MEDICAL & SURGICAL HISTORY:  HLD (hyperlipidemia)  Hypertension  No significant past surgical history          MEDICATIONS:  MEDICATIONS  (STANDING):  aspirin enteric coated 81 milliGRAM(s) Oral daily  atorvastatin 80 milliGRAM(s) Oral at bedtime  enalapril 10 milliGRAM(s) Oral daily  enoxaparin Injectable 40 milliGRAM(s) SubCutaneous daily  polyethylene glycol 3350 17 Gram(s) Oral daily      Allergies    No Known Allergies    Intolerances        FAMILY HISTORY:  Family history of cancer in father: liver  Family history of cancer in mother: Mother, she had a wart on head, bleeding, removed and biopsied. Diagnosed as malignant melanoma at the age of 23 years. Had 4 surgeries and passed away at the age of 79.  Family history of stroke or transient ischemic attack in son    Non-contributary for premature coronary disease or sudden cardiac death    SOCIAL HISTORY:    [ X] Non-smoker  [ ] Smoker  [ ] Alcohol      REVIEW OF SYSTEMS:  [ ]chest pain  [  ]shortness of breath  [  ]palpitations  [  ]syncope  [ ]near syncope [ ]upper extremity weakness   [ ] lower extremity weakness  [  ]diplopia  [  ]altered mental status   [  ]fevers  [ ]chills [ ]nausea  [ ]vomitting  [  ]dysphagia    [ ]abdominal pain  [ ]melena  [ ]BRBPR    [  ]epistaxis  [  ]rash    [ ]lower extremity edema        [X ] All others negative	  [ ] Unable to obtain      LABS:	 	    CARDIAC MARKERS:  CARDIAC MARKERS ( 16 Aug 2019 06:17 )  <0.015 ng/mL / x     / 99 U/L / x     / 2.3 ng/mL  CARDIAC MARKERS ( 15 Aug 2019 10:27 )  <0.015 ng/mL / x     / 110 U/L / x     / 2.9 ng/mL  CARDIAC MARKERS ( 15 Aug 2019 04:58 )  <0.015 ng/mL / x     / x     / x     / x                                  13.3   4.25  )-----------( 244      ( 16 Aug 2019 06:17 )             39.1     Hb Trend: 13.3<--    08-16    133<L>  |  100  |  10  ----------------------------<  96  3.9   |  26  |  0.72    Ca    9.2      16 Aug 2019 06:17  Phos  3.1     08-16  Mg     2.3     08-16    TPro  6.6  /  Alb  3.7  /  TBili  0.5  /  DBili  x   /  AST  12  /  ALT  16  /  AlkPhos  61  08-16    Creatinine Trend: 0.72<--, 0.85<--    HgA1c: Hemoglobin A1C, Whole Blood: 5.8 % (08-16 @ 09:10)    TSH: Thyroid Stimulating Hormone, Serum: 1.19 uU/mL (08-16 @ 06:17)          PHYSICAL EXAM:  T(C): 37 (08-16-19 @ 07:45), Max: 37 (08-16-19 @ 07:45)  HR: 82 (08-16-19 @ 07:45) (73 - 82)  BP: 132/90 (08-16-19 @ 07:45) (113/60 - 161/76)  RR: 18 (08-16-19 @ 07:45) (16 - 18)  SpO2: 98% (08-16-19 @ 07:45) (96% - 100%)  Wt(kg): --  I&O's Summary    15 Aug 2019 07:01  -  16 Aug 2019 07:00  --------------------------------------------------------  IN: 230 mL / OUT: 0 mL / NET: 230 mL    16 Aug 2019 07:01  -  16 Aug 2019 10:24  --------------------------------------------------------  IN: 200 mL / OUT: 0 mL / NET: 200 mL        Gen: Appears well in NAD  HEENT:  (-)icterus (-)pallor  CV: N S1 S2 1/6 JEFF (+)2 Pulses B/l  Resp:  Clear to ausculatation B/L, normal effort  GI: (+) BS Soft, NT, ND  Lymph:  (-)Edema, (-)obvious lymphadenopathy  Skin: Warm to touch, Normal turgor  Psych: Appropriate mood and affect        TELEMETRY: 	  Sinus, APC's some of which are blocked      ECG:  	Sinus 1 degree AV block, APCs, some of which block    RADIOLOGY:         CXR:  No infiltrate    ASSESSMENT/PLAN: 	84y Female   pmh of HTN, HLD, Disc herniation, chronic cystitis presents from home after ?cardiology office called her about a possible arrythmia on her Holter monitor.     - Obtain strips from Dr. Fay's office (call placed)  - Echo  - She has episodes of A-V blocked that may be triggered by APCs.  Level of Block is unclear may need EP eval / EPS  - Discussed With Patient and team    I once again thank you for allowing me to participate in the care of your patient.  If you have any questions or concerns please do not hesitate to contact me.    Amado Redmond MD, [facility]  BEEPER [phone]

## 2019-08-16 NOTE — DISCHARGE NOTE PROVIDER - HOSPITAL COURSE
85 yo F from home, lives alone, ambulates independently with pmh of HTN, HLD, Disc herniation, chronic cystitis presents from home after cardiology office called her about a possible arrythmia on her Holter monitor. Pt was placed on Holter monitor for 24 hrs initially and then extended to 3 weeks by her cardiologist She received a call at 3 am today that it is medical emergency and she has to go to the hospital immediately due to arrhythmia. She experienced contraction and relaxation of the heart. It is a/w chest pain, pressure type, squeezing, sub sternal, radiating laterally and to the back, aggravated with anxiety, negative thoughts and relieved with distractions. Her son passed away couple of years ago due to stroke that makes her condition worse. She was hypotensive 90/45, her PMD decreased the dose of Enalapril to 2.5 from 10mg. She had prior episodes of LOC twice in a year but since 2 yrs not experiencing. When she has LOC, she sits on the floor, EMS will be called and she will be taken to the Clinic where they administer some different solutions and monitor her for 2-3 hrs.. Denies fever, cough, SOB, palpitations, syncope, dizziness, edema, nausea, vomiting, pleuritic chest pain, palpitations.    GOC Needs time to make a decision.         her all three troponins came back negative.     Patietnt want to sign AMA and leave ASAP.

## 2019-09-16 NOTE — H&P ADULT - PROBLEM SELECTOR PLAN 3
Patient with more cough:  Will defer to you if we should be increaseing prednisone since MTX not being started again.    Thank you.        continue with statin  f/u lipid panel

## 2020-02-26 NOTE — ED ADULT NURSE NOTE - NS ED NURSE LEVEL OF CONSCIOUSNESS MENTAL STATUS
Surgical Oncology Inpatient Progress Note    Subjective:  Patient is feeling better. Nausea/pain improved.  +flatus     Objective:  Temp:  [97.6 °F (36.4 °C)-98.4 °F (36.9 °C)] 97.6 °F (36.4 °C)  Pulse:  [107-109] 107  Resp:  [18-20] 18  BP: (112-128)/(72-7 Pager: [phone] Awake/Cooperative/Alert

## 2020-03-24 NOTE — PATIENT PROFILE ADULT - NSPROSPIRITUALVALUESFT_GEN_A_NUR
n/a Isotretinoin Counseling: Patient should get monthly blood tests, not donate blood, not drive at night if vision affected, not share medication, and not undergo elective surgery for 6 months after tx completed. Side effects reviewed, pt to contact office should one occur.

## 2021-01-01 NOTE — ED PROVIDER NOTE - DISPOSITION TYPE
Called this AM by Dr. Felipe Burton resident who was concerned about infant having a low platelet level this AM on her CBC, level was 57. This CBC was drawn from a heelstick. Mom does have a history of idiopathic thrombocytopenia and she was on prednisone during her pregnancy. Repeat level was drawn via venous stick, which was WNL. (220). Examined infant who initially was feeding well at the breast. Infant is vigorous with no signs of petechiae and/or bleeding. Infant has good pulses and heart rate and is well appearing. No further orders were given. DISCHARGE

## 2021-07-03 ENCOUNTER — EMERGENCY (EMERGENCY)
Facility: HOSPITAL | Age: 86
LOS: 1 days | Discharge: HOME | End: 2021-07-03
Attending: EMERGENCY MEDICINE
Payer: MEDICARE

## 2021-07-03 VITALS
WEIGHT: 112.44 LBS | HEART RATE: 83 BPM | RESPIRATION RATE: 20 BRPM | OXYGEN SATURATION: 99 % | TEMPERATURE: 98 F | DIASTOLIC BLOOD PRESSURE: 84 MMHG | SYSTOLIC BLOOD PRESSURE: 164 MMHG

## 2021-07-03 VITALS — SYSTOLIC BLOOD PRESSURE: 150 MMHG | DIASTOLIC BLOOD PRESSURE: 74 MMHG

## 2021-07-03 PROCEDURE — 99283 EMERGENCY DEPT VISIT LOW MDM: CPT | Mod: GC

## 2021-07-03 PROCEDURE — 99283 EMERGENCY DEPT VISIT LOW MDM: CPT

## 2021-07-03 NOTE — ED PROVIDER NOTE - OBJECTIVE STATEMENT
Physical Therapy Daily Treatment Note    Date:  10/19/2018    Patient Name:  Charley Mireles    :  1950  MRN: [id number]  Restrictions/Precautions:    Medical/Treatment Diagnosis Information:  · Diagnosis: R knee pain   Insurance/Certification information:  PT Insurance Information: Medicare and [facility] Sandip Tang  Physician Information:  Referring Practitioner: Tera Ryder MD  Plan of care signed (Y/N):  N  Visit# / total visits:        G-Code (if applicable):         PT G-Codes  Functional Assessment Tool Used: LEFS  Score: 52/80 (44/80 at eval)  Functional Limitation: Mobility: Walking and moving around  Mobility: Walking and Moving Around Current Status (): At least 40 percent but less than 60 percent impaired, limited or restricted  Mobility: Walking and Moving Around Goal Status (): At least 20 percent but less than 40 percent impaired, limited or restricted    Medicare Cap (if applicable):  679 = total amount used, updated 10/19/2018    Time in:   3:00     Timed Treatment: 30 Total Treatment Time:  30  ________________________________________________________________________________________    Pain Level:   0-1/10 R lower back  SUBJECTIVE:  Pt reports that everything has been feeling pretty good. Symptoms ebb and flow now with the weather versus with activity.      OBJECTIVE:     Exercise/Equipment Resistance/Repetitions Other comments          TA set with BP cuff     -    HEP   bridge HEP   LTR HEP   Prone hip IR/ER with resistance    clamshell HEP   Hip stacking with LE on SB 2x30 B              rockerboard  1' balance, 1' rocking each drection    Romberg on airex 2x30\" EO/EC    Dowel angie neuro re-ed     Tandem stance on airex X60\" B    Double limb stance on BOSU X60\"  With minisquat 10x    B post sling 6\" step up 10x B    Lateral post sling with cross stabilization  10x B                TG  Level 3 x 5 minutes                                  Other Therapeutic Activities: Patient is an 86 year old female who has a hx of HTN, HLD who presents for hypertension. Patient states she took a nap and woke up around Patient is an 86 year old female who has a hx of HTN, HLD who presents for hypertension. Patient states she took a nap and woke up around 11pm and took her blood pressure which had SBP > 200. Patient reports feeling anxious and has stressors at home, also reports some slight SOB at time of taking blood pressure. Patient states that she has not missed her home HTN meds. Called the ambulance because she was worried about her blood pressure.

## 2021-07-03 NOTE — ED PROVIDER NOTE - PATIENT PORTAL LINK FT
You can access the FollowMyHealth Patient Portal offered by [facility] by registering at the following website: [email or web address]. By joining [employer]’s FollowMyHealth portal, you will also be able to view your health information using other applications (apps) compatible with our system. You can access the FollowMyHealth Patient Portal offered by [facility] by registering at the following website: [email or web address]. By joining [employer]’s FollowMyHealth portal, you will also be able to view your health information using other applications (apps) compatible with our system.

## 2021-07-03 NOTE — ED PROVIDER NOTE - NSFOLLOWUPINSTRUCTIONS_ED_ALL_ED_FT
You came to the hospital for high blood pressure. This could be due to stress or anxiety at home. Your blood pressure while you were in the Emergency Department was in an acceptable range. You did not have symptoms of organ damage on physical exam. You should continue to take your home blood pressure medication. Follow up with your Primary Care Doctor. Return to the Emergency Department if you develop chest pain or shortness of breath associated with high blood pressure readings.

## 2021-07-03 NOTE — ED ADULT TRIAGE NOTE - BP NONINVASIVE DIASTOLIC (MM HG)
84 Pt is a 47 y/o F nonsmoker PMhx ovarian cancer (last chemo 8/2017), h/o surgical removal of mass (3/9/18) p/w abdominal pain since yesterday -- r/o intra-abdominal pathology -- labs, ua, ucx, CT abd and pelvis w/ contrast

## 2021-07-03 NOTE — ED PROVIDER NOTE - CLINICAL SUMMARY MEDICAL DECISION MAKING FREE TEXT BOX
86 year old female with hx of htn coming in for asymptomatic hypertension. Patient had a SBP reading of 200mmhg at home. In hospital patients SBP readings ranging from 140-170 SBP and 70-80 DBP. Patient is asymptomatic. She denies chest pain, sob, fevers, chills. Patient is stable for d/c home w/ follow up with PCP. 86 year old female with hx of htn coming in for asymptomatic hypertension. Patient had a SBP reading of 200mmhg at home. In hospital patients SBP readings ranging from 140-170 SBP and 70-80 DBP. Patient is asymptomatic. She denies chest pain, sob, fevers, chills. Patient is stable for d/c home w/ follow up with PCP.    Dr. Mercado (Attending Physician)

## 2021-07-03 NOTE — ED PROVIDER NOTE - CARE PROVIDER_API CALL
Vianey Gann  FAMILY MEDICINE  [address]  [place] [id number]  Phone: [phone]  Fax: [phone]  Established Patient  Follow Up Time: 1-3 Days

## 2021-07-03 NOTE — ED ADULT NURSE NOTE - NSIMPLEMENTINTERV_GEN_ALL_ED
Implemented All Fall with Harm Risk Interventions:  [place] to call system. Call bell, personal items and telephone within reach. Instruct patient to call for assistance. Room bathroom lighting operational. Non-slip footwear when patient is off stretcher. Physically safe environment: no spills, clutter or unnecessary equipment. Stretcher in lowest position, wheels locked, appropriate side rails in place. Provide visual cue, wrist band, yellow gown, etc. Monitor gait and stability. Monitor for mental status changes and reorient to person, place, and time. Review medications for side effects contributing to fall risk. Reinforce activity limits and safety measures with patient and family. Provide visual clues: red socks.

## 2021-07-03 NOTE — ED PROVIDER NOTE - ATTENDING CONTRIBUTION TO CARE
Dr. Mercado (Attending Physician)  I performed a history and physical exam of the patient and discussed their management with the resident. I reviewed the resident's note and agree with the documented findings and plan of care. My medical decision making and observations are found above.

## 2021-07-03 NOTE — ED PROVIDER NOTE - PHYSICAL EXAMINATION
PHYSICAL EXAM:  Vital Signs Last 24 Hrs  T(C): 36.7 (07-03-21 @ 01:41)  T(F): 98 (07-03-21 @ 01:41), Max: 98 (07-03-21 @ 01:41)  HR: 72 (07-03-21 @ 01:41) (72 - 83)  BP: 150/74 (07-03-21 @ 02:24)  BP(mean): --  RR: 18 (07-03-21 @ 01:41) (18 - 20)  SpO2: 98% (07-03-21 @ 01:41) (98% - 99%)  Wt(kg): --    Constitutional: NAD, awake and alert  EYES: EOMI  ENT:  Normal Hearing, no tonsillar exudates   Neck: Soft and supple , no thyromegaly   Respiratory: Breath sounds are clear bilaterally, No wheezing, rales or rhonchi  Cardiovascular: RRR, no murmurs   Gastrointestinal: Bowel Sounds present, soft, nontender, nondistended, no guarding, no rebound  Extremities: No cyanosis or clubbing; warm to touch  Vascular: 2+ peripheral pulses lower ex  Neurological: No focal deficits  Musculoskeletal: moving all 4 extremities spontaneously   Skin: No rashes  Psych: no depression or anhedonia, AAOx3  HEME: no bruises, no nose bleeds

## 2021-07-03 NOTE — ED ADULT NURSE NOTE - OBJECTIVE STATEMENT
86y female presents to ED via EMS, complaining of HTN. AOx4 endorses waking up at approximately 2300 on 7/2/21 and feeling "unwell" took BP and SBP was 190, patient took x2 sublingual Nitroglycerin pills. Denies headache, chest pain, fever, chills, SOB, back pain, dysuria, hematuria.

## 2022-06-23 ENCOUNTER — EMERGENCY (EMERGENCY)
Facility: HOSPITAL | Age: 87
LOS: 1 days | Discharge: HOME | End: 2022-06-23
Attending: EMERGENCY MEDICINE
Payer: MEDICARE

## 2022-06-23 VITALS
SYSTOLIC BLOOD PRESSURE: 172 MMHG | HEART RATE: 79 BPM | HEIGHT: 65 IN | TEMPERATURE: 98 F | DIASTOLIC BLOOD PRESSURE: 87 MMHG | RESPIRATION RATE: 18 BRPM | WEIGHT: 112.44 LBS | OXYGEN SATURATION: 97 %

## 2022-06-23 PROCEDURE — 99282 EMERGENCY DEPT VISIT SF MDM: CPT

## 2022-06-23 PROCEDURE — 99283 EMERGENCY DEPT VISIT LOW MDM: CPT

## 2022-06-23 RX ORDER — LABETALOL HCL 100 MG
100 TABLET ORAL ONCE
Refills: 0 | Status: COMPLETED | OUTPATIENT
Start: 2022-06-23 | End: 2022-06-23

## 2022-06-23 RX ORDER — ACETAMINOPHEN 500 MG
975 TABLET ORAL ONCE
Refills: 0 | Status: COMPLETED | OUTPATIENT
Start: 2022-06-23 | End: 2022-06-23

## 2022-06-23 RX ADMIN — Medication 100 MILLIGRAM(S): at 19:57

## 2022-06-23 RX ADMIN — Medication 975 MILLIGRAM(S): at 19:57

## 2022-06-23 NOTE — ED PROVIDER NOTE - OBJECTIVE STATEMENT
86 yo F pmh of HTN, GERD, anxiety, presents with elevated BP at home. Pt was nervous and had mild HA. Denies other acute complaints. Cypriot  used.

## 2022-06-23 NOTE — ED PROVIDER NOTE - PATIENT PORTAL LINK FT
You can access the FollowMyHealth Patient Portal offered by [facility] by registering at the following website: [email or web address]. By joining [employer]’s FollowMyHealth portal, you will also be able to view your health information using other applications (apps) compatible with our system.

## 2022-06-23 NOTE — ED PROVIDER NOTE - CLINICAL SUMMARY MEDICAL DECISION MAKING FREE TEXT BOX
Elderly F with elevated BP  Normal neuro exam  BP improved after meds  Dc with out pt fu  Discussed indications for patient return to ED. Patient understood.

## 2022-06-23 NOTE — ED PROVIDER NOTE - NS ED ROS FT
CONSTITUTIONAL: no fever  EYES: no eye pain   ENMT: no throat pain  CARDIOVASCULAR: no chest pain   RESPIRATORY: no shortness of breath   GASTROINTESTINAL: no abdominal pain   GENITOURINARY: no dysuria   SKIN: no rashes  NEUROLOGICAL: +headache

## 2022-06-23 NOTE — ED PROVIDER NOTE - PHYSICAL EXAMINATION
GENERAL: well appearing, no acute distress   HEAD: atraumatic   EYES: EOMI   ENT: moist oral mucosa   CARDIAC: regular rate  RESPIRATORY: no increased work of breathing   ABDO: soft NTND  MUSCULOSKELETAL: no deformity   NEUROLOGICAL: AAOx3, CN's II-XII intact, strength 5/5 bilateral UE and LE, sensation intact to light touch, finger to nose intact, steady gait  SKIN: no visible rash  PSYCHIATRIC: cooperative

## 2022-11-04 ENCOUNTER — INPATIENT (INPATIENT)
Facility: HOSPITAL | Age: 87
LOS: 0 days | Discharge: HOME | End: 2022-11-05
Attending: INTERNAL MEDICINE | Admitting: INTERNAL MEDICINE

## 2022-11-04 VITALS
HEART RATE: 98 BPM | RESPIRATION RATE: 18 BRPM | TEMPERATURE: 99 F | DIASTOLIC BLOOD PRESSURE: 95 MMHG | OXYGEN SATURATION: 100 % | SYSTOLIC BLOOD PRESSURE: 183 MMHG

## 2022-11-04 PROCEDURE — 93010 ELECTROCARDIOGRAM REPORT: CPT

## 2022-11-04 PROCEDURE — 99285 EMERGENCY DEPT VISIT HI MDM: CPT | Mod: CS

## 2022-11-04 NOTE — ED ADULT TRIAGE NOTE - CHIEF COMPLAINT QUOTE
Pt brought in by EMS from home, c/o elevated BP of 210/114 since this afternoon. States meds would not bring it down. Also endorses lower back pain. No complaints of chest pain, headache, blurry vision, nausea, dizziness, vomiting  SOB, fever, or chills. [facility] HTN     : [id number]

## 2022-11-05 VITALS
HEART RATE: 76 BPM | OXYGEN SATURATION: 100 % | RESPIRATION RATE: 16 BRPM | SYSTOLIC BLOOD PRESSURE: 191 MMHG | TEMPERATURE: 97 F | DIASTOLIC BLOOD PRESSURE: 85 MMHG

## 2022-11-05 DIAGNOSIS — R07.9 CHEST PAIN, UNSPECIFIED: ICD-10-CM

## 2022-11-05 LAB
ALBUMIN SERPL ELPH-MCNC: 4.7 G/DL — SIGNIFICANT CHANGE UP (ref 3.3–5)
ALP SERPL-CCNC: 92 U/L — SIGNIFICANT CHANGE UP (ref 40–120)
ALT FLD-CCNC: 15 U/L — SIGNIFICANT CHANGE UP (ref 4–33)
ANION GAP SERPL CALC-SCNC: 13 MMOL/L — SIGNIFICANT CHANGE UP (ref 7–14)
AST SERPL-CCNC: 21 U/L — SIGNIFICANT CHANGE UP (ref 4–32)
BILIRUB SERPL-MCNC: 0.4 MG/DL — SIGNIFICANT CHANGE UP (ref 0.2–1.2)
BUN SERPL-MCNC: 9 MG/DL — SIGNIFICANT CHANGE UP (ref 7–23)
CALCIUM SERPL-MCNC: 9.2 MG/DL — SIGNIFICANT CHANGE UP (ref 8.4–10.5)
CHLORIDE SERPL-SCNC: 90 MMOL/L — LOW (ref 98–107)
CO2 SERPL-SCNC: 23 MMOL/L — SIGNIFICANT CHANGE UP (ref 22–31)
CREAT SERPL-MCNC: 0.49 MG/DL — LOW (ref 0.5–1.3)
EGFR: 91 ML/MIN/1.73M2 — SIGNIFICANT CHANGE UP
GLUCOSE SERPL-MCNC: 121 MG/DL — HIGH (ref 70–99)
HCT VFR BLD CALC: 37.4 % — SIGNIFICANT CHANGE UP (ref 34.5–45)
HGB BLD-MCNC: 13.1 G/DL — SIGNIFICANT CHANGE UP (ref 11.5–15.5)
MAGNESIUM SERPL-MCNC: 2 MG/DL — SIGNIFICANT CHANGE UP (ref 1.6–2.6)
MCHC RBC-ENTMCNC: 30.4 PG — SIGNIFICANT CHANGE UP (ref 27–34)
MCHC RBC-ENTMCNC: 35 GM/DL — SIGNIFICANT CHANGE UP (ref 32–36)
MCV RBC AUTO: 86.8 FL — SIGNIFICANT CHANGE UP (ref 80–100)
NRBC # BLD: 0 /100 WBCS — SIGNIFICANT CHANGE UP (ref 0–0)
NRBC # FLD: 0 K/UL — SIGNIFICANT CHANGE UP (ref 0–0)
PHOSPHATE SERPL-MCNC: 2.6 MG/DL — SIGNIFICANT CHANGE UP (ref 2.5–4.5)
PLATELET # BLD AUTO: 248 K/UL — SIGNIFICANT CHANGE UP (ref 150–400)
POTASSIUM SERPL-MCNC: 3.9 MMOL/L — SIGNIFICANT CHANGE UP (ref 3.5–5.3)
POTASSIUM SERPL-SCNC: 3.9 MMOL/L — SIGNIFICANT CHANGE UP (ref 3.5–5.3)
PROT SERPL-MCNC: 7 G/DL — SIGNIFICANT CHANGE UP (ref 6–8.3)
RBC # BLD: 4.31 M/UL — SIGNIFICANT CHANGE UP (ref 3.8–5.2)
RBC # FLD: 12.4 % — SIGNIFICANT CHANGE UP (ref 10.3–14.5)
SARS-COV-2 RNA SPEC QL NAA+PROBE: SIGNIFICANT CHANGE UP
SODIUM SERPL-SCNC: 126 MMOL/L — LOW (ref 135–145)
TROPONIN T, HIGH SENSITIVITY RESULT: 18 NG/L — SIGNIFICANT CHANGE UP
WBC # BLD: 6.81 K/UL — SIGNIFICANT CHANGE UP (ref 3.8–10.5)
WBC # FLD AUTO: 6.81 K/UL — SIGNIFICANT CHANGE UP (ref 3.8–10.5)

## 2022-11-05 PROCEDURE — 71046 X-RAY EXAM CHEST 2 VIEWS: CPT | Mod: 26

## 2022-11-05 RX ORDER — ASPIRIN/CALCIUM CARB/MAGNESIUM 324 MG
162 TABLET ORAL ONCE
Refills: 0 | Status: COMPLETED | OUTPATIENT
Start: 2022-11-05 | End: 2022-11-05

## 2022-11-05 RX ORDER — AMLODIPINE BESYLATE 2.5 MG/1
5 TABLET ORAL ONCE
Refills: 0 | Status: COMPLETED | OUTPATIENT
Start: 2022-11-05 | End: 2022-11-05

## 2022-11-05 RX ADMIN — Medication 162 MILLIGRAM(S): at 04:46

## 2022-11-05 NOTE — ED PROVIDER NOTE - NS ED ROS FT
CONSTITUTIONAL: No weakness, fevers or chills  EYES/ENT: No visual changes;  No vertigo or throat pain, + headaches  NECK: No pain or stiffness  RESPIRATORY: No cough, wheezing, hemoptysis; + shortness of breath  CARDIOVASCULAR: +chest pain, no palpitations  GASTROINTESTINAL: + abdominal epigastric pain. No nausea, vomiting, or hematemesis; + constipation. No melena or hematochezia.  GENITOURINARY: No dysuria, frequency or hematuria  NEUROLOGICAL: No numbness or weakness  SKIN: No itching, burning, rashes, or lesions   PSYCH: no hx depression, no hx anxiety

## 2022-11-05 NOTE — ED ADULT NURSE NOTE - CHIEF COMPLAINT QUOTE
Pt brought in by EMS from home, c/o elevated BP of 210/114 since this afternoon. States meds would not bring it down. Also endorses lower back pain. No complaints of chest pain, headache, blurry vision, nausea, dizziness, vomiting  SOB, fever, or chills. [facility] HTN     : [id number]

## 2022-11-05 NOTE — ED PROVIDER NOTE - PLAN OF CARE
87 Y/O F with HTN, HLD, ELENA, GERD presenting with elevated BP, L. sided CP. EKG unremarkable. Will send CBC/CMP, trops, CXR L. sided chest pressure as above  will get trops, CBC/CMP

## 2022-11-05 NOTE — ED PROVIDER NOTE - NSFOLLOWUPCLINICS_GEN_ALL_ED_FT
[facility]  Cardiology  [address] [place] [address]  Phone: [phone]  Fax:   Follow Up Time: 1-3 days

## 2022-11-05 NOTE — ED PROVIDER NOTE - PROGRESS NOTE DETAILS
Reviewed labs. Has hypoNa - pt reports that she has had hypoNa for ~3 years.   admit to tele : #[id number] ([place])   Had multiple conversations with the patient to stay in the hospital for stress test and card recommendations however pt insists on leaving. Pt fixation on getting recommendations written down by the medical team despite multiple conversations with the patient that our recommendation was to stay in the hospital as above.   Will discharge pt with return precautions Patient with initial troponin of 18, still complaining of some chest pain, EKG nonischemic.  Plan was to admit patient to telemetry doc for stress and echo and telemetry, patient was initially in agreement with plan and admission order was placed.  Over the course of the next few hours the patient continued to repeatedly ask for a copy of her lab results and recommendations for follow-up.  Both myself and the resident and the nurse spoke with the patient multiple times via  phone to explain that our recommendation is that she stay in the hospital for stress test and echo, we also discussed her lab test with her.  She continued to state that she did not want stay in the hospital and would prefer to follow-up as an outpatient and just wanted a copy of her results and recommendations for follow-up.  Advised patient that should she want to return the hospital for cardiac work up and admission she can return at any time. Ernestine: Patient with initial troponin of 18, still complaining of some chest pain, EKG nonischemic.  Plan was to admit patient to telemetry doc for stress and echo and telemetry, patient was initially in agreement with plan and admission order was placed.  Over the course of the next few hours the patient continued to repeatedly ask for a copy of her lab results and recommendations for follow-up.  Both myself and the resident and the nurse spoke with the patient multiple times via  phone to explain that our recommendation is that she stay in the hospital for stress test and echo, we also discussed her lab test with her.  She continued to state that she did not want stay in the hospital and would prefer to follow-up as an outpatient and just wanted a copy of her results and recommendations for follow-up.  Advised patient that should she want to return the hospital for cardiac work up and admission she can return at any time.

## 2022-11-05 NOTE — ED PROVIDER NOTE - CARE PLAN
Principal Discharge DX:	Left chest pressure  Assessment and plan of treatment:	89 Y/O F with HTN, HLD, ELENA, GERD presenting with elevated BP, L. sided CP. EKG unremarkable. Will send CBC/CMP, trops, CXR   1 Principal Discharge DX:	Chest pain   Principal Discharge DX:	Chest pain  Assessment and plan of treatment:	L. sided chest pressure as above  will get trops, CBC/CMP

## 2022-11-05 NOTE — ED PROVIDER NOTE - PHYSICAL EXAMINATION
GENERAL: NAD, well-appearing, lying in bed comfortably  HEAD:  Atraumatic, Normocephalic  EYES: EOMI, conjunctiva and sclera clear  ENT: Moist mucous membranes  NECK: Supple, No JVD  CHEST/LUNG: Clear to auscultation bilaterally; No rales, rhonchi, wheezing, or rubs. Unlabored respirations  HEART: Regular rate and rhythm; No murmurs, rubs, or gallops  ABDOMEN: BSx4; Soft, nontender, nondistended  EXTREMITIES:  2+ Peripheral Pulses, brisk capillary refill. No clubbing, cyanosis, or edema  NERVOUS SYSTEM:  A&Ox3, no focal deficits   SKIN: No rashes or lesions

## 2022-11-05 NOTE — ED PROVIDER NOTE - ATTENDING CONTRIBUTION TO CARE
Elderly female with history of hypertension, anxiety presents to the emergency department with complaints of elevated blood pressures at home for the last many weeks as well as intermittent left-sided chest pain radiating to left shoulder.  Patient states she does suffer from anxiety and is prescribed lorazepam.  Saw her PMD yesterday, he did not adjust her medications, performed an EKG which was reportedly normal per patient and told her that she is fine.  She presents today because she is continuing to have chest pain is aborted up with blood pressure readings at home.  Blood pressure here mildly elevated, though not critical.  EKG without acute ischemia.  She has a cardiology appointment on November 20.  Plan for labs with troponin, chest x-ray.  If work-up in ED is negative will likely discharge home as patient already has cardiology follow-up and will give strict return precautions to the emergency department. Elderly female with history of hypertension, anxiety presents to the emergency department with complaints of elevated blood pressures at home for the last many weeks as well as intermittent left-sided chest pain radiating to left shoulder.  Patient states she does suffer from anxiety and is prescribed lorazepam.  Saw her PMD yesterday, he did not adjust her medications, performed an EKG which was reportedly normal per patient and told her that she is fine.  She presents today because she is continuing to have chest pain is aborted up with blood pressure readings at home.  Blood pressure here mildly elevated, though not critical.  EKG without acute ischemia.  She has a cardiology appointment on November 20.  Plan for labs with troponin, chest x-ray.  If work-up in ED is negative will likely discharge home as patient already has cardiology follow-up and will give strict return precautions to the emergency department.  *Same  used "Mariangel"

## 2022-11-05 NOTE — ED ADULT NURSE NOTE - OBJECTIVE STATEMENT
Break coverage- Pt received into spot #20a. Pt primarily Algerian speaking, as per language line , c/o having high blood pressure this evening. Pt states she is compliant with her medications. Denies headache/dizziness/n/v. Denies chest pain at this time. Also c/o lower back pain. MD yang being performed. no acute distress. Awaiting further plan of care. Break coverage- Pt received into spot #20a. Pt primarily Australian speaking, as per language line , c/o having high blood pressure this evening and fluctuating over the past month. Pt states she is compliant with her medications. Denies headache/dizziness/n/v. c/o left sided chest pressure radiating to her shoulder. Also c/o lower back pain. MD yang being performed. no acute distress. Awaiting further plan of care.

## 2022-11-05 NOTE — ED PROVIDER NOTE - PATIENT PORTAL LINK FT
You can access the FollowMyHealth Patient Portal offered by [facility] by registering at the following website: [email or web address]. By joining [employer]’s FollowMyHealth portal, you will also be able to view your health information using other applications (apps) compatible with our system.

## 2022-11-05 NOTE — ED PROVIDER NOTE - CLINICAL SUMMARY MEDICAL DECISION MAKING FREE TEXT BOX
87 Y/O F with HTN, HLD, ELENA, GERD presenting with elevated BP, L. sided CP. EKG unremarkable. Will send CBC/CMP, trops, CXR

## 2022-11-05 NOTE — ED ADULT NURSE REASSESSMENT NOTE - NS ED NURSE REASSESS COMMENT FT1
PT is resting in stretcher, easily arousable to verbal stimuli. no apparent distress noted. pt requesting to be discharged she wants to follow up out patient and does not want to be admitted to the hospital. MD Sinha made aware and DAI [id number] interpret used to educate pt on MD recommendations.  will continue to monitor.

## 2022-11-05 NOTE — ED PROVIDER NOTE - OBJECTIVE STATEMENT
89 Y/O F with HTN, HLD, ELENA, GERD presenting with elevated BP. Notes that her BP is low in the AM usually in the 90's and is high in the afternoon's around 160-170 which has been going on for the past couple of months. Of note, BP has been 200s in the afternoon over the past 2 days. Reports L. sided chest pressure that began in March, described as pressure-like sensation, intermittent, travels to the L. shoulder with more frequency and intensity since yest. Had a PCP appt yest with normal EKG and cardiology appt in a couple of weeks. Also reports bilateral temporal headaches which began several years ago but has noticed that her headaches are worse in the afternoons lately - described as squeezing pain, non-radiating, lasts between mins to hours. Also reports abdominal pain for years usually related to her constipation. Denies nausea, dysuria, hematuria, melena/hematochezia, dizziness,

## 2022-11-05 NOTE — ED PROVIDER NOTE - NSFOLLOWUPINSTRUCTIONS_ED_ALL_ED_FT
You came with high blood pressure and chest pain. You were found to have cardiac enzymes that was at an indeterminate level. Given your concerning symptoms you were recommended to undergo a stress test however you did not want to stay in the hospital for further testing. You were also found to have low sodium levels which has been a chronic issue     If you have continuing progressively worsening chest pain or have worsening symptoms or have dizziness, low blood pressure, please come back to the emergency room.     Please follow-up with your PCP within 48-72 hours. Please follow-up with the cardiologist within 3-5 days for further testing as outpatient.

## 2022-12-12 ENCOUNTER — EMERGENCY (EMERGENCY)
Facility: HOSPITAL | Age: 87
LOS: 1 days | Discharge: HOME | End: 2022-12-12
Attending: EMERGENCY MEDICINE
Payer: MEDICARE

## 2022-12-12 VITALS
WEIGHT: 105.82 LBS | HEIGHT: 61.42 IN | DIASTOLIC BLOOD PRESSURE: 95 MMHG | RESPIRATION RATE: 17 BRPM | OXYGEN SATURATION: 97 % | TEMPERATURE: 98 F | SYSTOLIC BLOOD PRESSURE: 206 MMHG | HEART RATE: 87 BPM

## 2022-12-12 VITALS
HEART RATE: 82 BPM | RESPIRATION RATE: 17 BRPM | SYSTOLIC BLOOD PRESSURE: 180 MMHG | TEMPERATURE: 98 F | DIASTOLIC BLOOD PRESSURE: 88 MMHG | OXYGEN SATURATION: 98 %

## 2022-12-12 PROCEDURE — 99282 EMERGENCY DEPT VISIT SF MDM: CPT

## 2022-12-12 PROCEDURE — 93005 ELECTROCARDIOGRAM TRACING: CPT

## 2022-12-12 PROCEDURE — 93010 ELECTROCARDIOGRAM REPORT: CPT

## 2022-12-12 PROCEDURE — 99284 EMERGENCY DEPT VISIT MOD MDM: CPT

## 2022-12-12 NOTE — ED ADULT NURSE NOTE - SUICIDE SCREENING DEPRESSION
Chief Complaint   Patient presents with     Laceration     Left pinky     Patient is here for a cut on his left pinky that happened yesterday. Patient states he was poking holes into a potato with a knife and hit his finger. Patient states he cleaned the area with peroxide twice.    Initial BP (!) 146/82   Pulse 97   Temp 98.5  F (36.9  C) (Tympanic)   Resp 16   Wt 108 kg (238 lb)   SpO2 98%  There is no height or weight on file to calculate BMI.  Medication Reconciliation: complete    Neelima Correa LPN   Negative

## 2022-12-12 NOTE — ED PROVIDER NOTE - NSICDXFAMILYHX_GEN_ALL_CORE_FT
FAMILY HISTORY:  Family history of cancer in father, liver  Family history of cancer in mother, Mother, she had a wart on head, bleeding, removed and biopsied. Diagnosed as malignant melanoma at the age of 23 years. Had 4 surgeries and passed away at the age of 79.  Family history of stroke or transient ischemic attack in son

## 2022-12-12 NOTE — ED PROVIDER NOTE - OBJECTIVE STATEMENT
88-year-old woman, history of hypertension, history of high cholesterol, complains of elevated blood pressure to a systolic BP of about 200 today, and says she has been taking her metoprolol and losartan.  He denies any new symptoms.  Specifically has no headache/chest pain/shortness of breath/vomiting/dizziness/syncope/focal weakness/hematuria.  She says that occasionally she has low blood pressure and when that happens, she skips a dose of her blood pressure medicine occasionally.

## 2022-12-12 NOTE — ED PROVIDER NOTE - CROS ED ROS STATEMENT
----- Message from Ml Yanez RN sent at 12/5/2017  8:40 AM CST -----  Contact: pt      ----- Message -----  From: Kim Robison  Sent: 12/5/2017   8:34 AM  To: [facility] Pre-Liver Transplant Clinical    Calling to speak with Cristina about her labs she said blood work is done please call @ # [phone].   all other ROS negative except as per HPI

## 2022-12-12 NOTE — ED PROVIDER NOTE - PATIENT PORTAL LINK FT
You can access the FollowMyHealth Patient Portal offered by [facility] by registering at the following website: [email or web address]. By joining [employer]’s FollowMyHealth portal, you will also be able to view your health information using other applications (apps) compatible with our system.

## 2022-12-12 NOTE — ED PROVIDER NOTE - CLINICAL SUMMARY MEDICAL DECISION MAKING FREE TEXT BOX
88-year-old woman, history of hypertension, history of high cholesterol, complains of elevated blood pressure to a systolic BP of about 200 today, and says she has been taking her metoprolol and losartan--BP elevated but asymptomatic and not an indication for acute treatment in the ED; Advised strict return precautions and PMD f/u, dietary recommendations and continuation of current antihypertensive regimen.

## 2023-03-09 NOTE — ED PROVIDER NOTE - CPE EDP ENMT NORM
She will maintain a strict nonweightbearing gait status to the left lower extremity.   Gait belt rolling walker x2 spt  Has [facility] ice machine  
normal...

## 2024-09-25 ENCOUNTER — EMERGENCY (EMERGENCY)
Facility: HOSPITAL | Age: 89
LOS: 1 days | Discharge: HOME | End: 2024-09-25
Attending: STUDENT IN AN ORGANIZED HEALTH CARE EDUCATION/TRAINING PROGRAM
Payer: MEDICARE

## 2024-09-25 VITALS
DIASTOLIC BLOOD PRESSURE: 98 MMHG | OXYGEN SATURATION: 96 % | HEART RATE: 73 BPM | TEMPERATURE: 98 F | RESPIRATION RATE: 19 BRPM | SYSTOLIC BLOOD PRESSURE: 203 MMHG

## 2024-09-25 PROCEDURE — 99284 EMERGENCY DEPT VISIT MOD MDM: CPT

## 2024-09-26 PROBLEM — F41.1 GENERALIZED ANXIETY DISORDER: Chronic | Status: ACTIVE | Noted: 2022-11-05

## 2024-09-26 PROBLEM — E78.5 HYPERLIPIDEMIA, UNSPECIFIED: Chronic | Status: ACTIVE | Noted: 2022-11-05

## 2024-09-26 PROBLEM — I10 ESSENTIAL (PRIMARY) HYPERTENSION: Chronic | Status: ACTIVE | Noted: 2022-11-05

## 2024-09-26 LAB
ALBUMIN SERPL ELPH-MCNC: 4.1 G/DL — SIGNIFICANT CHANGE UP (ref 3.5–5)
ALP SERPL-CCNC: 99 U/L — SIGNIFICANT CHANGE UP (ref 40–120)
ALT FLD-CCNC: 38 U/L DA — SIGNIFICANT CHANGE UP (ref 10–60)
ANION GAP SERPL CALC-SCNC: 8 MMOL/L — SIGNIFICANT CHANGE UP (ref 5–17)
APTT BLD: 28.3 SEC — SIGNIFICANT CHANGE UP (ref 24.5–35.6)
AST SERPL-CCNC: 32 U/L — SIGNIFICANT CHANGE UP (ref 10–40)
BASOPHILS # BLD AUTO: 0.01 K/UL — SIGNIFICANT CHANGE UP (ref 0–0.2)
BASOPHILS NFR BLD AUTO: 0.2 % — SIGNIFICANT CHANGE UP (ref 0–2)
BILIRUB SERPL-MCNC: 0.4 MG/DL — SIGNIFICANT CHANGE UP (ref 0.2–1.2)
BUN SERPL-MCNC: 16 MG/DL — SIGNIFICANT CHANGE UP (ref 7–18)
CALCIUM SERPL-MCNC: 8.8 MG/DL — SIGNIFICANT CHANGE UP (ref 8.4–10.5)
CHLORIDE SERPL-SCNC: 95 MMOL/L — LOW (ref 96–108)
CO2 SERPL-SCNC: 25 MMOL/L — SIGNIFICANT CHANGE UP (ref 22–31)
CREAT SERPL-MCNC: 0.64 MG/DL — SIGNIFICANT CHANGE UP (ref 0.5–1.3)
EGFR: 84 ML/MIN/1.73M2 — SIGNIFICANT CHANGE UP
EGFR: 84 ML/MIN/1.73M2 — SIGNIFICANT CHANGE UP
EOSINOPHIL # BLD AUTO: 0.07 K/UL — SIGNIFICANT CHANGE UP (ref 0–0.5)
EOSINOPHIL NFR BLD AUTO: 1.4 % — SIGNIFICANT CHANGE UP (ref 0–6)
GLUCOSE SERPL-MCNC: 131 MG/DL — HIGH (ref 70–99)
HCT VFR BLD CALC: 35.8 % — SIGNIFICANT CHANGE UP (ref 34.5–45)
HGB BLD-MCNC: 12.3 G/DL — SIGNIFICANT CHANGE UP (ref 11.5–15.5)
IMM GRANULOCYTES NFR BLD AUTO: 0.2 % — SIGNIFICANT CHANGE UP (ref 0–0.9)
INR BLD: 0.85 RATIO — SIGNIFICANT CHANGE UP (ref 0.85–1.16)
LYMPHOCYTES # BLD AUTO: 1.04 K/UL — SIGNIFICANT CHANGE UP (ref 1–3.3)
LYMPHOCYTES # BLD AUTO: 21.4 % — SIGNIFICANT CHANGE UP (ref 13–44)
MAGNESIUM SERPL-MCNC: 2.1 MG/DL — SIGNIFICANT CHANGE UP (ref 1.6–2.6)
MCHC RBC-ENTMCNC: 30.4 PG — SIGNIFICANT CHANGE UP (ref 27–34)
MCHC RBC-ENTMCNC: 34.4 GM/DL — SIGNIFICANT CHANGE UP (ref 32–36)
MCV RBC AUTO: 88.6 FL — SIGNIFICANT CHANGE UP (ref 80–100)
MONOCYTES # BLD AUTO: 0.49 K/UL — SIGNIFICANT CHANGE UP (ref 0–0.9)
MONOCYTES NFR BLD AUTO: 10.1 % — SIGNIFICANT CHANGE UP (ref 2–14)
NEUTROPHILS # BLD AUTO: 3.23 K/UL — SIGNIFICANT CHANGE UP (ref 1.8–7.4)
NEUTROPHILS NFR BLD AUTO: 66.7 % — SIGNIFICANT CHANGE UP (ref 43–77)
NRBC # BLD: 0 /100 WBCS — SIGNIFICANT CHANGE UP (ref 0–0)
NRBC BLD-RTO: 0 /100 WBCS — SIGNIFICANT CHANGE UP (ref 0–0)
PLATELET # BLD AUTO: 213 K/UL — SIGNIFICANT CHANGE UP (ref 150–400)
POTASSIUM SERPL-MCNC: 4.1 MMOL/L — SIGNIFICANT CHANGE UP (ref 3.5–5.3)
POTASSIUM SERPL-SCNC: 4.1 MMOL/L — SIGNIFICANT CHANGE UP (ref 3.5–5.3)
PROT SERPL-MCNC: 7 G/DL — SIGNIFICANT CHANGE UP (ref 6–8.3)
PROTHROM AB SERPL-ACNC: 9.9 SEC — SIGNIFICANT CHANGE UP (ref 9.9–13.4)
RBC # BLD: 4.04 M/UL — SIGNIFICANT CHANGE UP (ref 3.8–5.2)
RBC # FLD: 12.3 % — SIGNIFICANT CHANGE UP (ref 10.3–14.5)
SODIUM SERPL-SCNC: 128 MMOL/L — LOW (ref 135–145)
WBC # BLD: 4.85 K/UL — SIGNIFICANT CHANGE UP (ref 3.8–10.5)
WBC # FLD AUTO: 4.85 K/UL — SIGNIFICANT CHANGE UP (ref 3.8–10.5)

## 2024-09-26 PROCEDURE — 85610 PROTHROMBIN TIME: CPT

## 2024-09-26 PROCEDURE — 80053 COMPREHEN METABOLIC PANEL: CPT

## 2024-09-26 PROCEDURE — 85025 COMPLETE CBC W/AUTO DIFF WBC: CPT

## 2024-09-26 PROCEDURE — 99283 EMERGENCY DEPT VISIT LOW MDM: CPT

## 2024-09-26 PROCEDURE — 83735 ASSAY OF MAGNESIUM: CPT

## 2024-09-26 PROCEDURE — 36415 COLL VENOUS BLD VENIPUNCTURE: CPT

## 2024-09-26 PROCEDURE — 85730 THROMBOPLASTIN TIME PARTIAL: CPT

## 2024-09-26 RX ADMIN — Medication 2 SPRAY(S): at 01:31
